# Patient Record
Sex: FEMALE | Race: WHITE
[De-identification: names, ages, dates, MRNs, and addresses within clinical notes are randomized per-mention and may not be internally consistent; named-entity substitution may affect disease eponyms.]

---

## 2022-01-01 ENCOUNTER — NURSE TRIAGE (OUTPATIENT)
Dept: OTHER | Facility: CLINIC | Age: 87
End: 2022-01-01

## 2022-01-01 NOTE — TELEPHONE ENCOUNTER
Subjective: Caller states \"My mom has had a cold and did a home test for covid and it is positive\"     Current Symptoms: Cough, congestion, chills, fatigue and body aches    Denies fever, sob, chest pain. Checking home o2 saturation - 94% - normal for patient. Onset: 3 days ago; gradual    Associated Symptoms: reduced activity    Pain Severity: 3/10; aching; mild    Temperature: 97.6 orally    What has been tried: Tylenol     Recommended disposition: Home care    Care advice provided, patient verbalizes understanding; denies any other questions or concerns; instructed to call back for any new or worsening symptoms. Patient will continue home care - advice given on when to call back or go to [address] [place] [address]. This triage is a result of a call to [address] [place] [address]. Please do not respond to the triage nurse through this encounter. Any subsequent communication should be directly with the patient.     Reason for Disposition   [1] COVID-19 diagnosed by positive lab test AND [2] mild symptoms (e.g., cough, fever, others) AND [1] no complications or SOB    Protocols used: COVID-19 - DIAGNOSED OR SUSPECTED-ADULT-

## 2022-10-01 ENCOUNTER — NURSE TRIAGE (OUTPATIENT)
Dept: OTHER | Facility: CLINIC | Age: 87
End: 2022-10-01

## 2022-10-01 NOTE — TELEPHONE ENCOUNTER
Subjective: Caller states \"She is having a nosebleed for over 1 hour\"     Current Symptoms: Nose bleed for the past 1.5 hours. L bleeding more than the R. No injury. Onset: 1.5 hours ago; worsening    Associated Symptoms: NA    Pain Severity: 0/10; N/A; none    Temperature: denies fever     What has been tried: ice pack and direct pressure for over 10 minutes-not helping. LMP: NA Pregnant: NA    Recommended disposition: Go to ED Now    Care advice provided, patient verbalizes understanding; denies any other questions or concerns; instructed to call back for any new or worsening symptoms. Patient/caller agrees to proceed to [facility]    This triage is a result of a call to [address] [place] [address]. Please do not respond to the triage nurse through this encounter. Any subsequent communication should be directly with the patient.     Reason for Disposition   [1] Bleeding present > 30 minutes AND [2] using correct method of direct pressure    Protocols used: Nosebleed-ADULT-